# Patient Record
(demographics unavailable — no encounter records)

---

## 2025-07-08 NOTE — PHYSICAL EXAM
[No Acute Distress] : no acute distress [Well Nourished] : well nourished [Well Developed] : well developed [Well-Appearing] : well-appearing [Normal Sclera/Conjunctiva] : normal sclera/conjunctiva [PERRL] : pupils equal round and reactive to light [EOMI] : extraocular movements intact [Normal Outer Ear/Nose] : the outer ears and nose were normal in appearance [Normal Oropharynx] : the oropharynx was normal [No JVD] : no jugular venous distention [No Lymphadenopathy] : no lymphadenopathy [Supple] : supple [Thyroid Normal, No Nodules] : the thyroid was normal and there were no nodules present [No Respiratory Distress] : no respiratory distress  [No Accessory Muscle Use] : no accessory muscle use [Clear to Auscultation] : lungs were clear to auscultation bilaterally [Normal Rate] : normal rate  [Regular Rhythm] : with a regular rhythm [Normal S1, S2] : normal S1 and S2 [No Murmur] : no murmur heard [No Carotid Bruits] : no carotid bruits [No Abdominal Bruit] : a ~M bruit was not heard ~T in the abdomen [No Varicosities] : no varicosities [Pedal Pulses Present] : the pedal pulses are present [No Edema] : there was no peripheral edema [No Palpable Aorta] : no palpable aorta [No Extremity Clubbing/Cyanosis] : no extremity clubbing/cyanosis [Soft] : abdomen soft [Non Tender] : non-tender [Non-distended] : non-distended [No Masses] : no abdominal mass palpated [No HSM] : no HSM [Normal Bowel Sounds] : normal bowel sounds [Normal Posterior Cervical Nodes] : no posterior cervical lymphadenopathy [Normal Anterior Cervical Nodes] : no anterior cervical lymphadenopathy [No CVA Tenderness] : no CVA  tenderness [No Spinal Tenderness] : no spinal tenderness [No Joint Swelling] : no joint swelling [Grossly Normal Strength/Tone] : grossly normal strength/tone [No Rash] : no rash [Coordination Grossly Intact] : coordination grossly intact [No Focal Deficits] : no focal deficits [Normal Gait] : normal gait [Deep Tendon Reflexes (DTR)] : deep tendon reflexes were 2+ and symmetric [Normal Affect] : the affect was normal [Normal Insight/Judgement] : insight and judgment were intact [de-identified] : +obese

## 2025-07-08 NOTE — HISTORY OF PRESENT ILLNESS
[FreeTextEntry8] : NP ACUTE Pt c/o chest pain, tightness for the last few months on and off, Happened last night woke up from sleep because of it 34 y/o male presents as a new patient with complaints of 2-3 months of chest discomfort that is intermittent. He notes that it awoke him from sleep last night.

## 2025-07-08 NOTE — PHYSICAL EXAM
[No Acute Distress] : no acute distress [Well Nourished] : well nourished [Well Developed] : well developed [Well-Appearing] : well-appearing [Normal Sclera/Conjunctiva] : normal sclera/conjunctiva [PERRL] : pupils equal round and reactive to light [EOMI] : extraocular movements intact [Normal Outer Ear/Nose] : the outer ears and nose were normal in appearance [Normal Oropharynx] : the oropharynx was normal [No JVD] : no jugular venous distention [No Lymphadenopathy] : no lymphadenopathy [Supple] : supple [Thyroid Normal, No Nodules] : the thyroid was normal and there were no nodules present [No Respiratory Distress] : no respiratory distress  [No Accessory Muscle Use] : no accessory muscle use [Clear to Auscultation] : lungs were clear to auscultation bilaterally [Normal Rate] : normal rate  [Regular Rhythm] : with a regular rhythm [Normal S1, S2] : normal S1 and S2 [No Murmur] : no murmur heard [No Carotid Bruits] : no carotid bruits [No Abdominal Bruit] : a ~M bruit was not heard ~T in the abdomen [No Varicosities] : no varicosities [Pedal Pulses Present] : the pedal pulses are present [No Edema] : there was no peripheral edema [No Palpable Aorta] : no palpable aorta [No Extremity Clubbing/Cyanosis] : no extremity clubbing/cyanosis [Soft] : abdomen soft [Non Tender] : non-tender [Non-distended] : non-distended [No HSM] : no HSM [No Masses] : no abdominal mass palpated [Normal Bowel Sounds] : normal bowel sounds [Normal Posterior Cervical Nodes] : no posterior cervical lymphadenopathy [Normal Anterior Cervical Nodes] : no anterior cervical lymphadenopathy [No CVA Tenderness] : no CVA  tenderness [No Spinal Tenderness] : no spinal tenderness [No Joint Swelling] : no joint swelling [Grossly Normal Strength/Tone] : grossly normal strength/tone [No Rash] : no rash [Coordination Grossly Intact] : coordination grossly intact [No Focal Deficits] : no focal deficits [Normal Gait] : normal gait [Deep Tendon Reflexes (DTR)] : deep tendon reflexes were 2+ and symmetric [Normal Affect] : the affect was normal [Normal Insight/Judgement] : insight and judgment were intact [de-identified] : +obese

## 2025-07-08 NOTE — ASSESSMENT
[FreeTextEntry1] : 34 y/o male presents as a new patient with complaints of 2-3 months of chest discomfort that is intermittent. He notes that it awoke him from sleep last night.    Chest Discomfort- Will send to Cardio, will check Chest XR Chest wall tenderness- advised to do stretches daily and ice/heat and may need to observe if it occurs after strenuous activity EKG- Sinus bradycardia at 59 BPM Elevated Blood Pressure- Will have pt check BP and return in 2 weeks for recheck. Take BP log with BPs twice daily and bring machine and log on next visit.  Family history of Heart Dz- will send to cardio Suspected Sleep apnea- Will send to Pulm for sleep study Obesity- Will send for nutrition, pt refused.  Fatigue- Will check fatigue labs.  Will order labs to be done fasting.

## 2025-07-08 NOTE — HEALTH RISK ASSESSMENT
[Yes] : Yes [2 - 4 times a month (2 pts)] : 2-4 times a month (2 points) [5 or 6 (2 pts)] : 5 or 6 (2  points) [Less than monthly (1 pt)] : Less than monthly (1 point) [No] : In the past 12 months have you used drugs other than those required for medical reasons? No [Little interest or pleasure doing things] : 1) Little interest or pleasure doing things [Feeling down, depressed, or hopeless] : 2) Feeling down, depressed, or hopeless [0] : 2) Feeling down, depressed, or hopeless: Not at all (0) [PHQ-2 Negative - No further assessment needed] : PHQ-2 Negative - No further assessment needed [I have developed a follow-up plan documented below in the note.] : I have developed a follow-up plan documented below in the note. [Never] : Never [Audit-CScore] : 5 [[id number]] : 0

## 2025-07-08 NOTE — HISTORY OF PRESENT ILLNESS
[FreeTextEntry8] : NP ACUTE Pt c/o chest pain, tightness for the last few months on and off, Happened last night woke up from sleep because of it 36 y/o male presents as a new patient with complaints of 2-3 months of chest discomfort that is intermittent. He notes that it awoke him from sleep last night.

## 2025-07-08 NOTE — HEALTH RISK ASSESSMENT
[Yes] : Yes [2 - 4 times a month (2 pts)] : 2-4 times a month (2 points) [5 or 6 (2 pts)] : 5 or 6 (2  points) [Less than monthly (1 pt)] : Less than monthly (1 point) [No] : In the past 12 months have you used drugs other than those required for medical reasons? No [Little interest or pleasure doing things] : 1) Little interest or pleasure doing things [Feeling down, depressed, or hopeless] : 2) Feeling down, depressed, or hopeless [0] : 2) Feeling down, depressed, or hopeless: Not at all (0) [PHQ-2 Negative - No further assessment needed] : PHQ-2 Negative - No further assessment needed [I have developed a follow-up plan documented below in the note.] : I have developed a follow-up plan documented below in the note. [Never] : Never [Audit-CScore] : 5 [[id number]] : 0

## 2025-07-08 NOTE — ASSESSMENT
[FreeTextEntry1] : 36 y/o male presents as a new patient with complaints of 2-3 months of chest discomfort that is intermittent. He notes that it awoke him from sleep last night.    Chest Discomfort- Will send to Cardio, will check Chest XR Chest wall tenderness- advised to do stretches daily and ice/heat and may need to observe if it occurs after strenuous activity EKG- Sinus bradycardia at 59 BPM Elevated Blood Pressure- Will have pt check BP and return in 2 weeks for recheck. Take BP log with BPs twice daily and bring machine and log on next visit.  Family history of Heart Dz- will send to cardio Suspected Sleep apnea- Will send to Pulm for sleep study Obesity- Will send for nutrition, pt refused.  Fatigue- Will check fatigue labs.  Will order labs to be done fasting.

## 2025-07-22 NOTE — ASSESSMENT
[Vaccines Reviewed] : Immunizations reviewed today. Please see immunization details in the vaccine log within the immunization flowsheet.  [FreeTextEntry1] : 36 y/o male presents for CPE.   Vision- 20/20 B/L, EKG done on last visit. Has open labs to be done fasting.  HTN- Will start amlodipine 5mg daily and have patient follow low sodium no more than 1000mg daily. Pt to make cardio appt. Suspected sleep apnea- Has upcoming appt with Pulm in 9/2025. Patient to keep BP log twice daily and bring in log and machine in 2 weeks.  Obesity- Advised to find a Martiniquais speaking nutritionist. Tdap- requests- administered today Sore throat- Strep test today is negative. Pt to treat supportively. May take claritin and flonase.

## 2025-07-22 NOTE — HISTORY OF PRESENT ILLNESS
[FreeTextEntry1] : cpe [de-identified] : 36 y/o male presents for CPE. He has elevated BP recently. His BP log has ranged from 160-170s/100-110s.  Today BP in office is 144/100.  Patient also complains of a sore throat since last night. he denies fever or chills or sick contacts. He has no other symptoms currently.

## 2025-07-22 NOTE — HEALTH RISK ASSESSMENT
[Poor] : ~his/her~ current health as poor [Good] : ~his/her~  mood as  good [2 - 4 times a month (2 pts)] : 2-4 times a month (2 points) [5 or 6 (2 pts)] : 5 or 6 (2  points) [Monthly (2 pts)] : Monthly (2 points) [Yes] : In the past 12 months have you used drugs other than those required for medical reasons? Yes [Little interest or pleasure doing things] : 1) Little interest or pleasure doing things [1] : 1) Little interest or pleasure doing things for several days (1) [Feeling down, depressed, or hopeless] : 2) Feeling down, depressed, or hopeless [0] : 2) Feeling down, depressed, or hopeless: Not at all (0) [PHQ-2 Negative - No further assessment needed] : PHQ-2 Negative - No further assessment needed [I have developed a follow-up plan documented below in the note.] : I have developed a follow-up plan documented below in the note. [Never] : Never [NO] : No [HIV Test offered] : HIV Test offered [Hepatitis C test offered] : Hepatitis C test offered [None] : None [With Significant Other] : lives with significant other [# of Members in Household ___] :  household currently consist of [unfilled] member(s) [Employed] : employed [College] : College [Significant Other] : lives with significant other [# Of Children ___] : has [unfilled] children [Sexually Active] : sexually active [Feels Safe at Home] : Feels safe at home [Reports normal functional visual acuity (ie: able to read med bottle)] : Reports normal functional visual acuity [Smoke Detector] : smoke detector [Carbon Monoxide Detector] : carbon monoxide detector [Safety elements used in home] : safety elements used in home [Seat Belt] :  uses seat belt [Audit-CScore] : 6 [[id number]] : 1 [de-identified] : Currently vaping [High Risk Behavior] : no high risk behavior [Reports changes in hearing] : Reports no changes in hearing [Reports changes in vision] : Reports no changes in vision [Reports changes in dental health] : Reports no changes in dental health [Sunscreen] : does not use sunscreen [de-identified] : All health risk assessments negative unless otherwise stated.  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

## 2025-07-22 NOTE — HEALTH RISK ASSESSMENT
[Poor] : ~his/her~ current health as poor [Good] : ~his/her~  mood as  good [2 - 4 times a month (2 pts)] : 2-4 times a month (2 points) [5 or 6 (2 pts)] : 5 or 6 (2  points) [Monthly (2 pts)] : Monthly (2 points) [Yes] : In the past 12 months have you used drugs other than those required for medical reasons? Yes [Little interest or pleasure doing things] : 1) Little interest or pleasure doing things [1] : 1) Little interest or pleasure doing things for several days (1) [Feeling down, depressed, or hopeless] : 2) Feeling down, depressed, or hopeless [0] : 2) Feeling down, depressed, or hopeless: Not at all (0) [PHQ-2 Negative - No further assessment needed] : PHQ-2 Negative - No further assessment needed [I have developed a follow-up plan documented below in the note.] : I have developed a follow-up plan documented below in the note. [Never] : Never [NO] : No [HIV Test offered] : HIV Test offered [Hepatitis C test offered] : Hepatitis C test offered [None] : None [With Significant Other] : lives with significant other [# of Members in Household ___] :  household currently consist of [unfilled] member(s) [Employed] : employed [College] : College [Significant Other] : lives with significant other [# Of Children ___] : has [unfilled] children [Sexually Active] : sexually active [Feels Safe at Home] : Feels safe at home [Reports normal functional visual acuity (ie: able to read med bottle)] : Reports normal functional visual acuity [Smoke Detector] : smoke detector [Carbon Monoxide Detector] : carbon monoxide detector [Safety elements used in home] : safety elements used in home [Seat Belt] :  uses seat belt [Audit-CScore] : 6 [[id number]] : 1 [de-identified] : Currently vaping [High Risk Behavior] : no high risk behavior [Reports changes in hearing] : Reports no changes in hearing [Reports changes in vision] : Reports no changes in vision [Reports changes in dental health] : Reports no changes in dental health [Sunscreen] : does not use sunscreen [de-identified] : All health risk assessments negative unless otherwise stated.  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

## 2025-07-22 NOTE — PHYSICAL EXAM
[No Acute Distress] : no acute distress [Well Nourished] : well nourished [Well Developed] : well developed [Well-Appearing] : well-appearing [Normal Sclera/Conjunctiva] : normal sclera/conjunctiva [EOMI] : extraocular movements intact [Normal Outer Ear/Nose] : the outer ears and nose were normal in appearance [Normal Oropharynx] : the oropharynx was normal [No JVD] : no jugular venous distention [No Lymphadenopathy] : no lymphadenopathy [Supple] : supple [Thyroid Normal, No Nodules] : the thyroid was normal and there were no nodules present [No Respiratory Distress] : no respiratory distress  [No Accessory Muscle Use] : no accessory muscle use [Clear to Auscultation] : lungs were clear to auscultation bilaterally [Normal Rate] : normal rate  [Regular Rhythm] : with a regular rhythm [Normal S1, S2] : normal S1 and S2 [No Murmur] : no murmur heard [No Varicosities] : no varicosities [Pedal Pulses Present] : the pedal pulses are present [No Edema] : there was no peripheral edema [No Palpable Aorta] : no palpable aorta [No Extremity Clubbing/Cyanosis] : no extremity clubbing/cyanosis [Soft] : abdomen soft [Non Tender] : non-tender [Non-distended] : non-distended [No Masses] : no abdominal mass palpated [No HSM] : no HSM [Normal Bowel Sounds] : normal bowel sounds [Normal Posterior Cervical Nodes] : no posterior cervical lymphadenopathy [Normal Anterior Cervical Nodes] : no anterior cervical lymphadenopathy [No Spinal Tenderness] : no spinal tenderness [No Joint Swelling] : no joint swelling [Grossly Normal Strength/Tone] : grossly normal strength/tone [No Rash] : no rash [Coordination Grossly Intact] : coordination grossly intact [No Focal Deficits] : no focal deficits [Normal Gait] : normal gait [Normal Affect] : the affect was normal [Normal Insight/Judgement] : insight and judgment were intact

## 2025-07-22 NOTE — HISTORY OF PRESENT ILLNESS
[FreeTextEntry1] : cpe [de-identified] : 36 y/o male presents for CPE. He has elevated BP recently. His BP log has ranged from 160-170s/100-110s.  Today BP in office is 144/100.  Patient also complains of a sore throat since last night. he denies fever or chills or sick contacts. He has no other symptoms currently.

## 2025-07-22 NOTE — ASSESSMENT
[Vaccines Reviewed] : Immunizations reviewed today. Please see immunization details in the vaccine log within the immunization flowsheet.  [FreeTextEntry1] : 34 y/o male presents for CPE.   Vision- 20/20 B/L, EKG done on last visit. Has open labs to be done fasting.  HTN- Will start amlodipine 5mg daily and have patient follow low sodium no more than 1000mg daily. Pt to make cardio appt. Suspected sleep apnea- Has upcoming appt with Pulm in 9/2025. Patient to keep BP log twice daily and bring in log and machine in 2 weeks.  Obesity- Advised to find a [place]ian speaking nutritionist. Tdap- requests- administered today Sore throat- Strep test today is negative. Pt to treat supportively. May take claritin and flonase.

## 2025-07-29 NOTE — HISTORY OF PRESENT ILLNESS
[FreeTextEntry1] : reason :  chest pain    HPI for today:  [phone]:  this is a  35 year old male with obesity here with hypertension and . obesity here wioth  chest pain  chest pain.  Onset: few months but feew 3 week ago he had an episode of cehst pain once ervery 2 months.    Type: Pressure ; Duration: intermittenty.  days. intensity: 6 /10, lasted for :   few mins;   Radiation:  none   Associated symptoms: + Dyspnea.  this epsiode 3 weeks ago.  he was sleeping. and it happened. no cehst paon on exertion.  he does have dyspnea on exertion 3 lgiht of staris.   + snoring.   + vaping: occasional alcohol. No drugs.   .vaping 1 cartridge: a week.    Family history: Father:  no myocardial infarction. no Cerebro vascular accident  heart surgery at age 55  Mother :  no myocardial infarction. No Cerebro vascular accident Siblings:  No myocardial infarction.  No CVA

## 2025-07-29 NOTE — DISCUSSION/SUMMARY
[Patient] : the patient [Risks] : risks [Benefits] : benefits [Alternatives] : alternatives [With Me] : with me [___ Month(s)] : in [unfilled] month(s) [EKG obtained to assist in diagnosis and management of assessed problem(s)] : EKG obtained to assist in diagnosis and management of assessed problem(s) [FreeTextEntry1] :  this is a  35 year old male with obesity here with hypertension and . obesity here wioth  chest pain   1) chest pain :    likely gastritis  r./o cad . intermediate risk factors for coronary artery disease.  echocardiogram with contrast if needed.   Nuclear stress test with IV technetium radiotracer.  2) hypertension :       increase the meds.  will give him combination pill.  weight loss.  low salt diet.  avoid pizza and Chinese food  obesity nd sleep apnea control  3( snoring: and obesity:  datigue. : home sleep study 4) screnein gfor choelsterol.  lipid profiel and lipoprotein acheck.